# Patient Record
Sex: MALE | Race: WHITE | Employment: UNEMPLOYED | URBAN - METROPOLITAN AREA
[De-identification: names, ages, dates, MRNs, and addresses within clinical notes are randomized per-mention and may not be internally consistent; named-entity substitution may affect disease eponyms.]

---
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
## 2025-01-06 ENCOUNTER — OFFICE VISIT (OUTPATIENT)
Dept: SURGERY | Age: 20
End: 2025-01-06
Payer: OTHER GOVERNMENT

## 2025-01-06 VITALS — SYSTOLIC BLOOD PRESSURE: 114 MMHG | HEART RATE: 87 BPM | DIASTOLIC BLOOD PRESSURE: 68 MMHG

## 2025-01-06 DIAGNOSIS — L92.9 HYPERGRANULATION: ICD-10-CM

## 2025-01-06 DIAGNOSIS — T14.8XXA WOUND INFECTION: ICD-10-CM

## 2025-01-06 DIAGNOSIS — T81.30XA WOUND DEHISCENCE: ICD-10-CM

## 2025-01-06 DIAGNOSIS — L08.89 PILONIDAL DISEASE OF NATAL CLEFT: Primary | ICD-10-CM

## 2025-01-06 DIAGNOSIS — L08.9 WOUND INFECTION: ICD-10-CM

## 2025-01-06 PROCEDURE — 99999 PR OFFICE/OUTPT VISIT,PROCEDURE ONLY: CPT | Performed by: SURGERY

## 2025-01-06 PROCEDURE — 11042 DBRDMT SUBQ TIS 1ST 20SQCM/<: CPT | Performed by: SURGERY

## 2025-01-06 NOTE — PROGRESS NOTES
Patient underwent pilonidal excision 9/17.  Has a wound dehiscence of a portion of the wound, and follow up for ongoing wound management.     Started on silver collagen 11/26     He is not having pain.  No new symptoms or concerns.        /68 (Site: Right Upper Arm, Position: Sitting)   Pulse 87          Length 1.6 (1.8)  Depth 0.6 (0.5)    Procedure note:  Kamaljit Yee wound(s) debrided.  Devitalized, necrotic, fibrinous material and biofilm was removed.  Hemostasis by direct pressure as needed.  Instruments used:   Curette: Yes   Forceps and scissors:  No   Scalpel: No   Tissue nippers or rongeur: No  Additional injected local anesthetic: No  Tissue obtained for culture: No  Additional hemostatic agents used:   Silver Nitrate: No   Electrocautery: No   Sutures: No   Topical agents (gel foam, thrombin, Surgicel): No  Depth of Debridement - subcutaneous  Debridement Size:  Roughly 1 cm2    IMP/PLAN  1) Continues to have slow improvement.  Using silver collagen, which he changes after showering daily.  - Would like to see more improvement visit to visit  - As along as he continues to improve will continue topical wound care with periodic debridements.  - Recheck in 2 weeks.

## 2025-01-20 ENCOUNTER — OFFICE VISIT (OUTPATIENT)
Dept: SURGERY | Age: 20
End: 2025-01-20
Payer: OTHER GOVERNMENT

## 2025-01-20 VITALS
BODY MASS INDEX: 27.46 KG/M2 | WEIGHT: 165 LBS | SYSTOLIC BLOOD PRESSURE: 117 MMHG | HEART RATE: 87 BPM | DIASTOLIC BLOOD PRESSURE: 70 MMHG

## 2025-01-20 DIAGNOSIS — L92.9 HYPERGRANULATION: ICD-10-CM

## 2025-01-20 DIAGNOSIS — L08.89 PILONIDAL DISEASE OF NATAL CLEFT: ICD-10-CM

## 2025-01-20 DIAGNOSIS — T81.30XA WOUND DEHISCENCE: Primary | ICD-10-CM

## 2025-01-20 DIAGNOSIS — L08.9 WOUND INFECTION: ICD-10-CM

## 2025-01-20 DIAGNOSIS — T14.8XXA WOUND INFECTION: ICD-10-CM

## 2025-01-20 PROCEDURE — 99024 POSTOP FOLLOW-UP VISIT: CPT | Performed by: SURGERY

## 2025-01-20 PROCEDURE — 17250 CHEM CAUT OF GRANLTJ TISSUE: CPT | Performed by: SURGERY

## 2025-01-20 NOTE — PROGRESS NOTES
Patient underwent pilonidal excision 9/17.  Has a wound dehiscence of a portion of the wound, and follow up for ongoing wound management.     Started on silver collagen 11/26     He is not having pain.  No new symptoms or concerns.    /70 (Site: Right Upper Arm, Position: Sitting, Cuff Size: Medium Adult)   Pulse 87   Wt 74.8 kg (165 lb)   BMI 27.46 kg/m²     The wounds shows some hypergranulation.  I decided to use silver nitrate today rather than the curette.      Silver nitrated was applied after topical anesthesia.    Measurements.    Length - 1.3 (1.6)  Depth - 0.4 (0.6)    IMP/PLAN  1) Continues to make small improvements.  2) Continue silver collagen  3) Recheck in 2 weeks  4) Consider excision if the healing stalls.

## 2025-02-03 ENCOUNTER — OFFICE VISIT (OUTPATIENT)
Dept: SURGERY | Age: 20
End: 2025-02-03
Payer: OTHER GOVERNMENT

## 2025-02-03 ENCOUNTER — ANESTHESIA EVENT (OUTPATIENT)
Dept: OPERATING ROOM | Age: 20
End: 2025-02-03

## 2025-02-03 VITALS
WEIGHT: 163 LBS | BODY MASS INDEX: 27.12 KG/M2 | SYSTOLIC BLOOD PRESSURE: 110 MMHG | HEART RATE: 86 BPM | DIASTOLIC BLOOD PRESSURE: 74 MMHG

## 2025-02-03 DIAGNOSIS — L08.9 WOUND INFECTION: ICD-10-CM

## 2025-02-03 DIAGNOSIS — L08.89 PILONIDAL DISEASE OF NATAL CLEFT: Primary | ICD-10-CM

## 2025-02-03 DIAGNOSIS — L08.89 PILONIDAL DISEASE OF NATAL CLEFT: ICD-10-CM

## 2025-02-03 DIAGNOSIS — T14.8XXA WOUND INFECTION: ICD-10-CM

## 2025-02-03 DIAGNOSIS — L92.9 HYPERGRANULATION: ICD-10-CM

## 2025-02-03 DIAGNOSIS — Z87.2 HISTORY OF PILONIDAL CYST: ICD-10-CM

## 2025-02-03 DIAGNOSIS — T81.30XA WOUND DEHISCENCE: Primary | ICD-10-CM

## 2025-02-03 PROBLEM — T81.89XD NONHEALING SURGICAL WOUND, SUBSEQUENT ENCOUNTER: Status: ACTIVE | Noted: 2025-02-03

## 2025-02-03 PROCEDURE — 11042 DBRDMT SUBQ TIS 1ST 20SQCM/<: CPT | Performed by: SURGERY

## 2025-02-03 NOTE — PATIENT INSTRUCTIONS
SURVEY:    You may be receiving a survey from [facility] regarding your visit today.    You may get this in the mail, through your MyChart, or in your email.     Please complete the survey to enable us to provide the highest quality of care to you and your family.    If you cannot score us a very good (5 Stars) on any question, please call the office to discuss how we could of made your experience exceptional.    Thank you!    General Surgery    MD Dr. Loren Amin, DO Dr. Marco Morel, HUMZA Boo-CNP    Pain Mgmt.  Dr. Randell Felton, GABRIELLA Navarrete LPN Brenda Boehler, LPN Jena Adams, MA Emily Akers, [place]    Phone: [phone]  Fax: [phone]    Office Hours:   M-TH 8-5, F: 8-12

## 2025-02-03 NOTE — PROGRESS NOTES
Patient underwent pilonidal excision 9/17.  Has a wound dehiscence of a portion of the wound, and follow up for ongoing wound management.     Started on silver collagen 11/26     He is not having pain.  No new symptoms or concerns.    No past medical history on file.  Past Surgical History:   Procedure Laterality Date    PILONIDAL CYST EXCISION N/A 9/17/2024    PILONIDAL CYSTECTOMY performed by Mitchell Vigil MD at [facility] OR     Current Outpatient Medications   Medication Sig Dispense Refill    ibuprofen (ADVIL;MOTRIN) 200 MG tablet Take 1 tablet by mouth every 6 hours as needed for Pain (Patient not taking: Reported on 1/6/2025)      naproxen (NAPROSYN) 375 MG tablet Take 1 pill 2 times a day for 5 days, then 2 times a day as needed for pain.  Take with food about 12 hours apart. (Patient not taking: Reported on 1/6/2025) 40 tablet 0     No current facility-administered medications for this visit.     No Known Allergies  Social History     Tobacco Use    Smoking status: Never    Smokeless tobacco: Never   Vaping Use    Vaping status: Never Used   Substance Use Topics    Alcohol use: No    Drug use: No     Family History   Problem Relation Age of Onset    Diabetes Mother     Depression Sister     Diabetes Maternal Grandfather       /74 (Site: Right Upper Arm, Position: Sitting)   Pulse 86   Wt 73.9 kg (163 lb)   BMI 27.12 kg/m²     Physical Exam  Constitutional:       General: He is not in acute distress.     Appearance: He is normal weight.   Cardiovascular:      Rate and Rhythm: Normal rate and regular rhythm.   Pulmonary:      Effort: Pulmonary effort is normal. No respiratory distress.      Breath sounds: Normal breath sounds.   Abdominal:      General: Abdomen is flat. Bowel sounds are normal.      Palpations: Abdomen is soft.   Skin:     General: Skin is warm and dry.      Comments: See wound documentation below   Neurological:      Mental Status: He is alert.        Wound  Measurements  Length 1.6

## 2025-02-04 ENCOUNTER — ANESTHESIA (OUTPATIENT)
Dept: OPERATING ROOM | Age: 20
End: 2025-02-04

## 2025-02-04 PROBLEM — Z87.2 HISTORY OF PILONIDAL CYST: Status: ACTIVE | Noted: 2025-02-03

## 2025-02-10 ENCOUNTER — ANESTHESIA EVENT (OUTPATIENT)
Dept: OPERATING ROOM | Age: 20
End: 2025-02-10
Payer: OTHER GOVERNMENT

## 2025-02-11 ENCOUNTER — HOSPITAL ENCOUNTER (OUTPATIENT)
Age: 20
Setting detail: OUTPATIENT SURGERY
Discharge: HOME | End: 2025-02-11
Attending: SURGERY | Admitting: SURGERY
Payer: OTHER GOVERNMENT

## 2025-02-11 ENCOUNTER — ANESTHESIA (OUTPATIENT)
Dept: OPERATING ROOM | Age: 20
End: 2025-02-11
Payer: OTHER GOVERNMENT

## 2025-02-11 VITALS
HEART RATE: 79 BPM | OXYGEN SATURATION: 99 % | RESPIRATION RATE: 14 BRPM | BODY MASS INDEX: 27.16 KG/M2 | DIASTOLIC BLOOD PRESSURE: 66 MMHG | WEIGHT: 163 LBS | TEMPERATURE: 97 F | HEIGHT: 65 IN | SYSTOLIC BLOOD PRESSURE: 126 MMHG

## 2025-02-11 DIAGNOSIS — G89.18 POST-OP PAIN: Primary | ICD-10-CM

## 2025-02-11 DIAGNOSIS — L08.89 PILONIDAL DISEASE OF NATAL CLEFT: ICD-10-CM

## 2025-02-11 DIAGNOSIS — T81.89XD NONHEALING SURGICAL WOUND, SUBSEQUENT ENCOUNTER: ICD-10-CM

## 2025-02-11 DIAGNOSIS — Z87.2 HISTORY OF PILONIDAL CYST: ICD-10-CM

## 2025-02-11 PROCEDURE — 2500000003 HC RX 250 WO HCPCS: Performed by: SURGERY

## 2025-02-11 PROCEDURE — 7100000011 HC PHASE II RECOVERY - ADDTL 15 MIN: Performed by: SURGERY

## 2025-02-11 PROCEDURE — 6370000000 HC RX 637 (ALT 250 FOR IP)

## 2025-02-11 PROCEDURE — 3600000012 HC SURGERY LEVEL 2 ADDTL 15MIN: Performed by: SURGERY

## 2025-02-11 PROCEDURE — 3700000000 HC ANESTHESIA ATTENDED CARE: Performed by: SURGERY

## 2025-02-11 PROCEDURE — 2580000003 HC RX 258

## 2025-02-11 PROCEDURE — 3600000002 HC SURGERY LEVEL 2 BASE: Performed by: SURGERY

## 2025-02-11 PROCEDURE — 6360000002 HC RX W HCPCS: Performed by: SURGERY

## 2025-02-11 PROCEDURE — 3700000001 HC ADD 15 MINUTES (ANESTHESIA): Performed by: SURGERY

## 2025-02-11 PROCEDURE — 2500000003 HC RX 250 WO HCPCS: Performed by: NURSE ANESTHETIST, CERTIFIED REGISTERED

## 2025-02-11 PROCEDURE — 14000 TIS TRNFR TRUNK 10 SQ CM/<: CPT | Performed by: SURGERY

## 2025-02-11 PROCEDURE — 88304 TISSUE EXAM BY PATHOLOGIST: CPT

## 2025-02-11 PROCEDURE — 2709999900 HC NON-CHARGEABLE SUPPLY: Performed by: SURGERY

## 2025-02-11 PROCEDURE — 7100000010 HC PHASE II RECOVERY - FIRST 15 MIN: Performed by: SURGERY

## 2025-02-11 PROCEDURE — 11042 DBRDMT SUBQ TIS 1ST 20SQCM/<: CPT | Performed by: SURGERY

## 2025-02-11 PROCEDURE — 6360000002 HC RX W HCPCS: Performed by: NURSE ANESTHETIST, CERTIFIED REGISTERED

## 2025-02-11 RX ORDER — GLYCOPYRROLATE 1 MG/5 ML
SYRINGE (ML) INTRAVENOUS
Status: DISCONTINUED | OUTPATIENT
Start: 2025-02-11 | End: 2025-02-11 | Stop reason: SDUPTHER

## 2025-02-11 RX ORDER — IBUPROFEN 200 MG
TABLET ORAL
Qty: 60 TABLET | Refills: 0 | Status: SHIPPED | OUTPATIENT
Start: 2025-02-11

## 2025-02-11 RX ORDER — METOCLOPRAMIDE HYDROCHLORIDE 5 MG/ML
10 INJECTION INTRAMUSCULAR; INTRAVENOUS
Status: DISCONTINUED | OUTPATIENT
Start: 2025-02-11 | End: 2025-02-11 | Stop reason: HOSPADM

## 2025-02-11 RX ORDER — DIMENHYDRINATE 50 MG
50 TABLET ORAL ONCE
Status: COMPLETED | OUTPATIENT
Start: 2025-02-11 | End: 2025-02-11

## 2025-02-11 RX ORDER — LIDOCAINE HYDROCHLORIDE 20 MG/ML
INJECTION, SOLUTION EPIDURAL; INFILTRATION; INTRACAUDAL; PERINEURAL
Status: DISCONTINUED | OUTPATIENT
Start: 2025-02-11 | End: 2025-02-11 | Stop reason: SDUPTHER

## 2025-02-11 RX ORDER — NEOSTIGMINE METHYLSULFATE 1 MG/ML
INJECTION INTRAVENOUS
Status: DISCONTINUED | OUTPATIENT
Start: 2025-02-11 | End: 2025-02-11 | Stop reason: SDUPTHER

## 2025-02-11 RX ORDER — SODIUM CHLORIDE 0.9 % (FLUSH) 0.9 %
5-40 SYRINGE (ML) INJECTION EVERY 12 HOURS SCHEDULED
Status: DISCONTINUED | OUTPATIENT
Start: 2025-02-11 | End: 2025-02-11 | Stop reason: HOSPADM

## 2025-02-11 RX ORDER — ONDANSETRON 2 MG/ML
INJECTION INTRAMUSCULAR; INTRAVENOUS
Status: DISCONTINUED | OUTPATIENT
Start: 2025-02-11 | End: 2025-02-11 | Stop reason: SDUPTHER

## 2025-02-11 RX ORDER — HYDROCODONE BITARTRATE AND ACETAMINOPHEN 5; 325 MG/1; MG/1
1 TABLET ORAL EVERY 6 HOURS PRN
Qty: 20 TABLET | Refills: 0 | Status: SHIPPED | OUTPATIENT
Start: 2025-02-11 | End: 2025-02-18

## 2025-02-11 RX ORDER — SODIUM CHLORIDE 0.9 % (FLUSH) 0.9 %
5-40 SYRINGE (ML) INJECTION PRN
Status: DISCONTINUED | OUTPATIENT
Start: 2025-02-11 | End: 2025-02-11 | Stop reason: HOSPADM

## 2025-02-11 RX ORDER — ACETAMINOPHEN 325 MG/1
650 TABLET ORAL ONCE
Status: COMPLETED | OUTPATIENT
Start: 2025-02-11 | End: 2025-02-11

## 2025-02-11 RX ORDER — FENTANYL CITRATE 50 UG/ML
50 INJECTION, SOLUTION INTRAMUSCULAR; INTRAVENOUS EVERY 5 MIN PRN
Status: DISCONTINUED | OUTPATIENT
Start: 2025-02-11 | End: 2025-02-11 | Stop reason: HOSPADM

## 2025-02-11 RX ORDER — PROPOFOL 10 MG/ML
INJECTION, EMULSION INTRAVENOUS
Status: DISCONTINUED | OUTPATIENT
Start: 2025-02-11 | End: 2025-02-11 | Stop reason: SDUPTHER

## 2025-02-11 RX ORDER — DEXAMETHASONE SODIUM PHOSPHATE 4 MG/ML
INJECTION, SOLUTION INTRA-ARTICULAR; INTRALESIONAL; INTRAMUSCULAR; INTRAVENOUS; SOFT TISSUE
Status: DISCONTINUED | OUTPATIENT
Start: 2025-02-11 | End: 2025-02-11 | Stop reason: SDUPTHER

## 2025-02-11 RX ORDER — FENTANYL CITRATE 50 UG/ML
INJECTION, SOLUTION INTRAMUSCULAR; INTRAVENOUS
Status: DISCONTINUED | OUTPATIENT
Start: 2025-02-11 | End: 2025-02-11 | Stop reason: SDUPTHER

## 2025-02-11 RX ORDER — KETOROLAC TROMETHAMINE 30 MG/ML
INJECTION, SOLUTION INTRAMUSCULAR; INTRAVENOUS
Status: DISCONTINUED | OUTPATIENT
Start: 2025-02-11 | End: 2025-02-11 | Stop reason: SDUPTHER

## 2025-02-11 RX ORDER — SODIUM CHLORIDE 9 MG/ML
INJECTION, SOLUTION INTRAVENOUS CONTINUOUS
Status: DISCONTINUED | OUTPATIENT
Start: 2025-02-11 | End: 2025-02-11 | Stop reason: HOSPADM

## 2025-02-11 RX ORDER — HYDROCODONE BITARTRATE AND ACETAMINOPHEN 5; 325 MG/1; MG/1
1 TABLET ORAL EVERY 6 HOURS PRN
Status: DISCONTINUED | OUTPATIENT
Start: 2025-02-11 | End: 2025-02-11 | Stop reason: HOSPADM

## 2025-02-11 RX ORDER — ROCURONIUM BROMIDE 10 MG/ML
INJECTION, SOLUTION INTRAVENOUS
Status: DISCONTINUED | OUTPATIENT
Start: 2025-02-11 | End: 2025-02-11 | Stop reason: SDUPTHER

## 2025-02-11 RX ORDER — SODIUM CHLORIDE 9 MG/ML
INJECTION, SOLUTION INTRAVENOUS PRN
Status: DISCONTINUED | OUTPATIENT
Start: 2025-02-11 | End: 2025-02-11 | Stop reason: HOSPADM

## 2025-02-11 RX ADMIN — ACETAMINOPHEN 650 MG: 325 TABLET ORAL at 09:56

## 2025-02-11 RX ADMIN — SODIUM CHLORIDE: 9 INJECTION, SOLUTION INTRAVENOUS at 09:58

## 2025-02-11 RX ADMIN — FENTANYL CITRATE 50 MCG: 50 INJECTION INTRAMUSCULAR; INTRAVENOUS at 11:20

## 2025-02-11 RX ADMIN — FENTANYL CITRATE 50 MCG: 50 INJECTION INTRAMUSCULAR; INTRAVENOUS at 11:06

## 2025-02-11 RX ADMIN — NEOSTIGMINE METHYLSULFATE 3 MG: 1 INJECTION, SOLUTION INTRAVENOUS at 11:52

## 2025-02-11 RX ADMIN — DEXAMETHASONE SODIUM PHOSPHATE 4 MG: 4 INJECTION INTRA-ARTICULAR; INTRALESIONAL; INTRAMUSCULAR; INTRAVENOUS; SOFT TISSUE at 11:25

## 2025-02-11 RX ADMIN — ONDANSETRON 4 MG: 2 INJECTION, SOLUTION INTRAMUSCULAR; INTRAVENOUS at 11:25

## 2025-02-11 RX ADMIN — KETOROLAC TROMETHAMINE 30 MG: 30 INJECTION, SOLUTION INTRAMUSCULAR at 11:54

## 2025-02-11 RX ADMIN — PROPOFOL 200 MG: 10 INJECTION, EMULSION INTRAVENOUS at 11:06

## 2025-02-11 RX ADMIN — DIMENHYDRINATE 50 MG: 50 TABLET ORAL at 09:56

## 2025-02-11 RX ADMIN — LIDOCAINE HYDROCHLORIDE 100 MG: 20 INJECTION, SOLUTION EPIDURAL; INFILTRATION; INTRACAUDAL; PERINEURAL at 11:06

## 2025-02-11 RX ADMIN — Medication 0.4 MG: at 11:52

## 2025-02-11 RX ADMIN — ROCURONIUM BROMIDE 30 MG: 10 INJECTION, SOLUTION INTRAVENOUS at 11:06

## 2025-02-11 RX ADMIN — CEFOXITIN SODIUM 2000 MG: 2 POWDER, FOR SOLUTION INTRAVENOUS at 11:19

## 2025-02-11 ASSESSMENT — PAIN - FUNCTIONAL ASSESSMENT: PAIN_FUNCTIONAL_ASSESSMENT: 0-10

## 2025-02-11 ASSESSMENT — PAIN SCALES - GENERAL: PAINLEVEL_OUTOF10: 0

## 2025-02-11 NOTE — DISCHARGE INSTRUCTIONS
SAME DAY SURGERY DISCHARGE INSTRUCTIONS    1.  Do not drive or operate hazardous machinery for 24 hours.    2.  Do not make important personal or business decisions for 24 hours.    3.  Do not drink alcoholic beverages for 24 hours.    4.  Do not smoke tobacco products for 24 hours.    5.  Eat light foods and drink plenty of fluids up to 8 glasses per day, as you can tolerate.    6.  If your bandages become soaked with bright red blood, place another dressing pad over your bandages.  (DO NOT remove original bandage.)  Call your surgeon for further instructions.  A small amount of bright red blood is to be expected.    7.  Limit your activities for 24 hours.  Do not engage in heavy work until your surgeon gives you permission.      8.  Patient should not be left alone for 12-24 hours following surgical procedure.    9.  Report the following signs or any questions regarding your physical condition to your surgeon immediately:    Excessive swelling of, or around the wound area.    Redness.    Temperature of 100 degrees (F) or above.    Excessive pain.    10.  Call your surgeon for any questions regarding your surgery.    11.  Wash hands before and after incision care.  It is important to practice good personal hygiene during the post op period.      Additional Instructions   1) Avoid sitting or other activities that put pressure on or stretch you wound.  2) Keep the wound clean. Shower or sitz bath after a bowel movement.   3) I have prescribed you 2 different pain medications  - Ibuprofen (it is available over the counter) is an NSAID and very good pain medication, but works better if you use it regularly. Use it regularly 400 mg 4  times a day for the first 5 days, then you can reduce it to using it as needed if you would like.  - I have also prescribed a narcotic pain medication, hydrocodone/acetaminophen, which you may use additionally as needed for pain.  As you pain improves you may substitute plain

## 2025-02-11 NOTE — PROGRESS NOTES
Patient refused offers of fluids or snacks at this time. Mom at bedside and aware to let me know if he changes his mind. Patient pretty drowsy still. Denies complaints of pain.   Eye Protection Verbiage: Before proceeding with the stage, a plastic scleral shield was inserted. The globe was anesthetized with a few drops of tetracaine hydrochloride. Then, an appropriate sized scleral shield was chosen and coated with lacrilube ointment. The shield was gently inserted and left in place for the duration of each stage. After the stage was completed, the shield was gently removed.

## 2025-02-11 NOTE — ANESTHESIA POSTPROCEDURE EVALUATION
Department of Anesthesiology  Postprocedure Note    Patient: Kamaljit Yee  MRN: [id number]  YOB: 2005  Date of evaluation: 2/11/2025    Procedure Summary       Date: 02/11/25 Room / Location: [address]    Anesthesia Start: 1104 Anesthesia Stop: 1202    Procedure: PILONIDAL CYSTECTOMY - Wound exploration and/or revision (Coccyx) Diagnosis:       History of pilonidal cyst      (History of pilonidal cyst [Z87.2])    Surgeons: Mitchell Vigil MD Responsible Provider: Jacques Dobson APRN - CRNA    Anesthesia Type: general ASA Status: 1            Anesthesia Type: No value filed.    Kaiden Phase I: Kaiden Score: 10    Kaiden Phase II: Kaiden Score: 10    Anesthesia Post Evaluation    Patient location during evaluation: bedside  Patient participation: complete - patient participated  Level of consciousness: awake and alert  Airway patency: patent  Nausea & Vomiting: no nausea and no vomiting  Cardiovascular status: hemodynamically stable  Respiratory status: acceptable  Hydration status: stable  Multimodal analgesia pain management approach  Pain management: adequate    No notable events documented.

## 2025-02-11 NOTE — H&P
Patient underwent pilonidal excision 9/17.  Has a wound dehiscence of a portion of the wound, and follow up for ongoing wound management.     Started on silver collagen 11/26     He is not having pain.  No new symptoms or concerns.    History reviewed. No pertinent past medical history.  Past Surgical History:   Procedure Laterality Date    PILONIDAL CYST EXCISION N/A 9/17/2024    PILONIDAL CYSTECTOMY performed by Mitchell Vigil MD at [facility] OR     Current Facility-Administered Medications   Medication Dose Route Frequency Provider Last Rate Last Admin    0.9 % sodium chloride infusion   IntraVENous Continuous Rupinder Bello APRN - CRNA 100 mL/hr at 02/11/25 0958 New Bag at 02/11/25 0958     No Known Allergies  Social History     Tobacco Use    Smoking status: Never    Smokeless tobacco: Never   Vaping Use    Vaping status: Never Used   Substance Use Topics    Alcohol use: No    Drug use: No     Family History   Problem Relation Age of Onset    Diabetes Mother     Depression Sister     Diabetes Maternal Grandfather       /68   Pulse 82   Temp 97.9 °F (36.6 °C) (Temporal)   Resp 17   Ht 1.651 m (5' 5\")   Wt 73.9 kg (163 lb)   SpO2 98%   BMI 27.12 kg/m²     Physical Exam  Constitutional:       General: He is not in acute distress.     Appearance: He is normal weight.   Cardiovascular:      Rate and Rhythm: Normal rate and regular rhythm.   Pulmonary:      Effort: Pulmonary effort is normal. No respiratory distress.      Breath sounds: Normal breath sounds.   Abdominal:      General: Abdomen is flat. Bowel sounds are normal.      Palpations: Abdomen is soft.   Skin:     General: Skin is warm and dry.      Comments: See wound documentation below   Neurological:      Mental Status: He is alert.        Wound  Measurements  Length 1.6 (1.3)  Depth 0.5 (0.4)    The wound has unhealthy granulation, that is very soft an friable.        Wound was treated with silver nitrate and curettement today, to  remove/debride the unhealthy tissue. The area debrided was subcutaneous tissue roughly 1.6 cm2    IMP/PLAN  1) Pilonidal Wound - Dehiscense  - Wound has stalled. Not much improvement in the past 4 weeks, and measure a little large today.  - I recommend we excise the non-healing area and close it.  I will try to move the incision farther off the midline too.  This may not work and he could continue to have wound healing problems , But the wound has not healed in nearly 5 months.  It very slowly improve, until it have finally stalled.  - He is agreeable to proceeding.

## 2025-02-11 NOTE — OP NOTE
Patient: Kamaljit Yee  YOB: 2005  MRN: [id number]    Date of Procedure: 2025    Pre-operative Diagnosis: Pilonidal Disease Brandy Cleft with non-healing post surgical wound     Post-operative Diagnosis:  Same    Procedure: Excision of wound (skin and subcutaneous 4.0 x 1.5 cm) with local advancement flap closure (Flap is about 1.5 x 4.0 cm or about 6 cm2)    Surgeon(s):  Mitchell Vigil MD    Assistant:   * No surgical staff found *    Anesthesia: Monitor Anesthesia Care    Estimated Blood Loss (mL): Minimal    Complications: None    Specimens:   ID Type Source Tests Collected by Time Destination   A : excision of pilonidal wound Tissue Coccyx SURGICAL PATHOLOGY Mitchell Vigil MD 2025 1140        Implants:  * No implants in log *      Drains: * No LDAs found *    Patient was brought to the operating room and general anesthesia was induced.  He was placed in prone jackknife position.  The skin of his buttocks and ARCELIA wound area was carefully prepped and draped.  He has a wound roughly 1 and half centimeters long maybe a little bit smaller than the nonhealing at the inferior end of a old pilonidal cystectomy incision.        It tunnels slightly superiorly.  I marked out an incision such that the suture line would be moved off the midline when we closed it.  This is a crescent shaped incision the intent of bringing the skin from his left buttock across to the right buttock.  Then using sharp dissection electrocautery the wound was excised back to normal skin and subcutaneous tissue.  I spent quite a bit of time making sure we had good hemostasis of the wound.  Wound was also irrigated with Irrisept and let it well and the wound for over a minute.  Went back and looked at her hemostasis again make sure everything was nice and dry there was any significant bleeding.  Next I proceeded with a layered closure.  Seven 2-0 Vicryl was used in the subcutaneous tissue to bring the skin together.  I

## 2025-02-13 LAB — SURGICAL PATHOLOGY REPORT: NORMAL

## 2025-02-19 ENCOUNTER — OFFICE VISIT (OUTPATIENT)
Dept: SURGERY | Age: 20
End: 2025-02-19

## 2025-02-19 VITALS
WEIGHT: 166 LBS | DIASTOLIC BLOOD PRESSURE: 83 MMHG | HEART RATE: 69 BPM | RESPIRATION RATE: 18 BRPM | SYSTOLIC BLOOD PRESSURE: 122 MMHG | OXYGEN SATURATION: 98 % | BODY MASS INDEX: 27.62 KG/M2

## 2025-02-19 DIAGNOSIS — T81.30XA WOUND DEHISCENCE: Primary | ICD-10-CM

## 2025-02-19 DIAGNOSIS — L08.89 PILONIDAL DISEASE OF NATAL CLEFT: ICD-10-CM

## 2025-02-19 PROCEDURE — 99024 POSTOP FOLLOW-UP VISIT: CPT | Performed by: SURGERY

## 2025-02-19 RX ORDER — HYDROCODONE BITARTRATE AND ACETAMINOPHEN 5; 325 MG/1; MG/1
1 TABLET ORAL EVERY 6 HOURS PRN
COMMUNITY

## 2025-02-19 NOTE — PROGRESS NOTES
8 days post re-excision pilonidal wound.    The wound closed nicely with minimal tension.        Unfortunately today it appears most of the sutures have pulled through. And it did not heal at all.          Measure about     Length 2.4  Width 0.4  Depth 1.3    Additionally there was a seroma which drained after removing the sutures.    It does not look infected.    IMP/PLAN  1) Not sure why he is not healing better.  I suspect he is not staying off of it, that is sitting quite a bit.   2) Will have him keep it clean and covered. Recheck in a week.

## 2025-02-19 NOTE — PATIENT INSTRUCTIONS
SURVEY:    You may be receiving a survey from [facility] regarding your visit today.    You may get this in the mail, through your MyChart, or in your email.     Please complete the survey to enable us to provide the highest quality of care to you and your family.    If you cannot score us a very good (5 Stars) on any question, please call the office to discuss how we could of made your experience exceptional.    Thank you!    General Surgery    MD Dr. Loren Amin, DO Dr. Marco Morel, HUMZA Boo-CNP    Pain Mgmt.  Dr. Randell Felton, GABRIELLA Navarrete LPN Brenda Boehler, LPN Jena Adams, MA Emily Akers, [place]    Phone: [phone]  Fax: [phone]    Office Hours:   M-TH 8-5, F: 8-12

## 2025-03-03 ENCOUNTER — OFFICE VISIT (OUTPATIENT)
Dept: SURGERY | Age: 20
End: 2025-03-03

## 2025-03-03 VITALS
SYSTOLIC BLOOD PRESSURE: 105 MMHG | BODY MASS INDEX: 27.62 KG/M2 | WEIGHT: 166 LBS | DIASTOLIC BLOOD PRESSURE: 67 MMHG | HEART RATE: 76 BPM

## 2025-03-03 DIAGNOSIS — T81.30XA WOUND DEHISCENCE: Primary | ICD-10-CM

## 2025-03-03 DIAGNOSIS — L08.89 PILONIDAL DISEASE OF NATAL CLEFT: ICD-10-CM

## 2025-03-03 PROCEDURE — 99024 POSTOP FOLLOW-UP VISIT: CPT | Performed by: SURGERY

## 2025-03-03 NOTE — PROGRESS NOTES
Ongoing management of dehiscence of his pilonidal wound.    Currently treatment - keep it clean and covered.  No special dressing.    Pain has resolved and he is only having a small amount of drainage.    /67 (Site: Left Upper Arm, Position: Sitting)   Pulse 76   Wt 75.3 kg (166 lb)   BMI 27.62 kg/m²         The wound is fairly clean.  No exudate or hypergranulation.    Length 2.7 (2.4)  Width 0.7 (0.4)  Depth 1.0 (1.3)    IMP/PLAN   1) The wound looks pretty clean. All of the measurements are a little large except depth. Suspect some retraction of the wound.  2) Continue to keep it clean and covered.  Recheck in 3 weeks (I am not here in 2 weeks).

## 2025-03-24 ENCOUNTER — OFFICE VISIT (OUTPATIENT)
Dept: SURGERY | Age: 20
End: 2025-03-24
Payer: OTHER GOVERNMENT

## 2025-03-24 VITALS — SYSTOLIC BLOOD PRESSURE: 121 MMHG | DIASTOLIC BLOOD PRESSURE: 71 MMHG | HEART RATE: 87 BPM

## 2025-03-24 DIAGNOSIS — L08.89 PILONIDAL DISEASE OF NATAL CLEFT: ICD-10-CM

## 2025-03-24 DIAGNOSIS — T81.30XA WOUND DEHISCENCE: Primary | ICD-10-CM

## 2025-03-24 DIAGNOSIS — L92.9 HYPERGRANULATION: ICD-10-CM

## 2025-03-24 PROCEDURE — 99024 POSTOP FOLLOW-UP VISIT: CPT | Performed by: SURGERY

## 2025-03-24 PROCEDURE — 17250 CHEM CAUT OF GRANLTJ TISSUE: CPT | Performed by: SURGERY

## 2025-03-24 NOTE — PROGRESS NOTES
Ongoing management of dehiscence of his pilonidal wound.    Original excision 9/17/24, re-excision 2/11/25     Currently treatment - keep it clean and covered.  No special dressing.     /71 (BP Site: Right Upper Arm, Patient Position: Sitting)   Pulse 87       Length 2.0 (2.7)  Width - Depends on how much traction placed on the wound  Depth 0.8 (1.0)    It had heavy hypergranulation. So it was treated with silver nitrate.    IMP/PLAN  1) Continue current treatment.  2) Recheck in 3 weeks.

## 2025-04-14 ENCOUNTER — OFFICE VISIT (OUTPATIENT)
Dept: SURGERY | Age: 20
End: 2025-04-14
Payer: OTHER GOVERNMENT

## 2025-04-14 VITALS — DIASTOLIC BLOOD PRESSURE: 74 MMHG | HEART RATE: 63 BPM | SYSTOLIC BLOOD PRESSURE: 110 MMHG

## 2025-04-14 DIAGNOSIS — T81.30XA WOUND DEHISCENCE: Primary | ICD-10-CM

## 2025-04-14 DIAGNOSIS — L08.89 PILONIDAL DISEASE OF NATAL CLEFT: ICD-10-CM

## 2025-04-14 DIAGNOSIS — L92.9 HYPERGRANULATION: ICD-10-CM

## 2025-04-14 PROCEDURE — 17250 CHEM CAUT OF GRANLTJ TISSUE: CPT | Performed by: SURGERY

## 2025-04-14 PROCEDURE — 11042 DBRDMT SUBQ TIS 1ST 20SQCM/<: CPT | Performed by: SURGERY

## 2025-04-14 NOTE — PROGRESS NOTES
Ongoing management of dehiscence of his pilonidal wound.     Original excision 9/17/24, re-excision 2/11/25     Currently treatment - keep it clean and covered.  No special dressing.    He and his mother believe it is getting better.    /74 (BP Site: Right Upper Arm, Patient Position: Sitting)   Pulse 63 .        The wound has filled in in the depth circumferentially.  It is a little hard to see in the photo. There was a large amount of hypergranulation, hair and other debris within the wound.     Topical anesthetic was applied.  Silver nitrate was used on the hypergranulation, and then it was debrided with a #4 curette. Tolerated well    Measurements.    Depth 1.1 (0.9)  Length 1.9 (2.0)    IMP/PLAN  1) Measurements don't  how it has filled in form the sides.  2) Slowly closing  3) Recheck in 2 - 3 weeks.    
No

## 2025-05-05 ENCOUNTER — OFFICE VISIT (OUTPATIENT)
Dept: SURGERY | Age: 20
End: 2025-05-05
Payer: OTHER GOVERNMENT

## 2025-05-05 VITALS — SYSTOLIC BLOOD PRESSURE: 118 MMHG | DIASTOLIC BLOOD PRESSURE: 78 MMHG | HEART RATE: 84 BPM

## 2025-05-05 DIAGNOSIS — T81.30XA WOUND DEHISCENCE: Primary | ICD-10-CM

## 2025-05-05 DIAGNOSIS — L08.89 PILONIDAL DISEASE OF NATAL CLEFT: ICD-10-CM

## 2025-05-05 DIAGNOSIS — L92.9 HYPERGRANULATION: ICD-10-CM

## 2025-05-05 PROCEDURE — 17250 CHEM CAUT OF GRANLTJ TISSUE: CPT | Performed by: SURGERY

## 2025-05-05 PROCEDURE — 99999 PR OFFICE/OUTPT VISIT,PROCEDURE ONLY: CPT | Performed by: SURGERY

## 2025-05-05 NOTE — PROGRESS NOTES
Ongoing management of dehiscence of his pilonidal wound.     Original excision 9/17/24, re-excision 2/11/25     Currently treatment - keep it clean and covered.  No special dressing.     He and his mother believe it is getting better again today.    /78 (BP Site: Right Upper Arm, Patient Position: Sitting, BP Cuff Size: Medium Adult)   Pulse 84         Significantly small    Length 0.8 (1.9)  Depth 0.3 (1.1)    It was hypergranulated, so I did treat it with silver nitrate.    IMP/PLAN  1) Marked improvement  2) Recheck in about 4 weeks

## 2025-06-09 ENCOUNTER — OFFICE VISIT (OUTPATIENT)
Dept: SURGERY | Age: 20
End: 2025-06-09
Payer: OTHER GOVERNMENT

## 2025-06-09 VITALS
HEIGHT: 65 IN | WEIGHT: 161.1 LBS | HEART RATE: 72 BPM | BODY MASS INDEX: 26.84 KG/M2 | OXYGEN SATURATION: 96 % | SYSTOLIC BLOOD PRESSURE: 126 MMHG | TEMPERATURE: 96.8 F | DIASTOLIC BLOOD PRESSURE: 74 MMHG

## 2025-06-09 DIAGNOSIS — L08.89 PILONIDAL DISEASE OF NATAL CLEFT: Primary | ICD-10-CM

## 2025-06-09 DIAGNOSIS — T81.30XA WOUND DEHISCENCE: ICD-10-CM

## 2025-06-09 DIAGNOSIS — L92.9 HYPERGRANULATION: ICD-10-CM

## 2025-06-09 PROCEDURE — 17250 CHEM CAUT OF GRANLTJ TISSUE: CPT | Performed by: SURGERY

## 2025-06-09 PROCEDURE — 99999 PR OFFICE/OUTPT VISIT,PROCEDURE ONLY: CPT | Performed by: SURGERY

## 2025-06-09 NOTE — PATIENT INSTRUCTIONS
SURVEY:    You may be receiving a survey from [facility] regarding your visit today.    You may get this in the mail, through your MyChart, or in your email.     Please complete the survey to enable us to provide the highest quality of care to you and your family.    If you cannot score us a very good (5 Stars) on any question, please call the office to discuss how we could of made your experience exceptional.    Thank you!    General Surgery    MD Dr. Loren Amin, DO  Dr. Marco Morel, DO  Gina Osei, HUMZA-CNP    Pain Mgmt.  Dr. Randell Felton, DO  JOSETTE Teague, JEFFRY Ash LPN Jena Adams, MA Emily Akers, MA    Phone: [phone]  Fax: [phone]    Office Hours:   M-TH 8-5, F: 8-12

## 2025-06-09 NOTE — PROGRESS NOTES
Ongoing management of dehiscence of his pilonidal wound.     Original excision 9/17/24, re-excision 2/11/25     Currently treatment - keep it clean and covered.  No special dressing.    No complaints or concerns.    /74   Pulse 72   Temp 96.8 °F (36 °C) (Tympanic)   Ht 1.651 m (5' 5\")   Wt 73.1 kg (161 lb 1.6 oz)   SpO2 96%   BMI 26.81 kg/m²         It measure similar to his last visit.  There was quite a bit of hair and lint in the wound, and significant hypergranulation.    After application of topical anesthetic, silver nitrate was applied.    IMP/PLAN  1) Wound may have stalled  - The was a lot of foreign body in the wound today, so he needs to be more diligent keeping it clean.  - There is not much left to the wound, but it is very slow to heal.  - Recheck in 2 - 4 weeks.

## 2025-06-30 ENCOUNTER — OFFICE VISIT (OUTPATIENT)
Dept: SURGERY | Age: 20
End: 2025-06-30
Payer: OTHER GOVERNMENT

## 2025-06-30 VITALS — SYSTOLIC BLOOD PRESSURE: 116 MMHG | DIASTOLIC BLOOD PRESSURE: 77 MMHG | HEART RATE: 75 BPM

## 2025-06-30 DIAGNOSIS — L08.89 PILONIDAL DISEASE OF NATAL CLEFT: Primary | ICD-10-CM

## 2025-06-30 PROCEDURE — 99212 OFFICE O/P EST SF 10 MIN: CPT | Performed by: SURGERY

## 2025-06-30 NOTE — PROGRESS NOTES
Patient and his mother believe the wound is healed.    /77 (BP Site: Right Upper Arm, Patient Position: Sitting, BP Cuff Size: Medium Adult)   Pulse 75         He has some dimpling of the skin, which may increase the risk of recurrence, but the wound is completely healed.    IMP/PLAN  1) Still should keep the area clean and consider regular hair removal to reduce the risk of relapse.  His mother brought laser hair removal.  That is any option.  While not permanent, it does give long lasting removal. It is medially reasonable to do.  If he wants to pursue laser hair removal, I believe there as adequate medical reason to it so his insurance may cover it. I can provide a letter if needed and desired.  2) Follow up PRN

## 2025-06-30 NOTE — PATIENT INSTRUCTIONS
SURVEY:    You may be receiving a survey [facility] regarding your [facility] today.    You may get this in the mail, through your MyChart, or in your email.     Please complete the survey to enable us to provide the highest quality of care to you and your family.    If you cannot score us a very good (5 Stars) on any question, please call the office to discuss how we could of made your experience exceptional.    Thank you!    General Surgery    MD Dr. Loren Amin, DO  Dr. Marco Morel, DO  Gina Osei, HUMZA-CNP    Pain Mgmt.  Dr. Randell Felton, DO  JOSETTE Teague, JEFFRY Ash LPN Jena Adams, MA Emily Akers, MA    Phone: [phone]  Fax: [phone]    Office Hours:   M-TH 8-5, F: 8-12

## (undated) DEVICE — SOLUTION IRRIG 1000ML 0.9% SOD CHL USP POUR PLAS BTL

## (undated) DEVICE — 450 ML BOTTLE OF 0.05% CHLORHEXIDINE GLUCONATE IN 99.95% STERILE WATER FOR IRRIGATION, USP AND APPLICATOR.: Brand: IRRISEPT ANTIMICROBIAL WOUND LAVAGE

## (undated) DEVICE — GAUZE,SPONGE,FLUFF,6"X6.75",STRL,5/TRAY: Brand: MEDLINE

## (undated) DEVICE — SUTURE VICRYL + SZ 3-0 L27IN ABSRB UD L26MM SH 1/2 CIR VCP416H

## (undated) DEVICE — GOWN,AURORA,NON-REINFORCED,2XL: Brand: MEDLINE

## (undated) DEVICE — SPONGE LAP W18XL18IN WHT COT 4 PLY FLD STRUNG RADPQ DISP ST 2 PER PACK

## (undated) DEVICE — PREVENA INCISION MANAGEMENT SYSTEM- PEEL & PLACE DRESSING: Brand: PREVENA™ PEEL & PLACE™

## (undated) DEVICE — TOWEL,OR,DSP,ST,NATURAL,DLX,4/PK,20PK/CS: Brand: MEDLINE

## (undated) DEVICE — TOWEL,OR,DSP,ST,BLUE,STD,4/PK,20PK/CS: Brand: MEDLINE

## (undated) DEVICE — TUBING, SUCTION, 3/16" X 20', STRAIGHT: Brand: MEDLINE

## (undated) DEVICE — PREMIUM DRY TRAY LF: Brand: MEDLINE INDUSTRIES, INC.

## (undated) DEVICE — LABEL PRNT MARKER W/ PRE PRNT CUST

## (undated) DEVICE — SUTURE NONABSORBABLE MONOFILAMENT 2-0 FS 18 IN ETHILON 664H

## (undated) DEVICE — GLOVE SURG SZ 8 L12IN FNGR THK87MIL WHT LTX FREE

## (undated) DEVICE — SUTURE VICRYL SZ 2-0 L27IN ABSRB UD L26MM SH 1/2 CIR J417H

## (undated) DEVICE — GAUZE,SPONGE,4"X4",12PLY,STERILE,LF,2'S: Brand: MEDLINE

## (undated) DEVICE — MERCY TIFFIN BASIC-LF: Brand: MEDLINE INDUSTRIES, INC.

## (undated) DEVICE — INTENDED FOR TISSUE SEPARATION, AND OTHER PROCEDURES THAT REQUIRE A SHARP SURGICAL BLADE TO PUNCTURE OR CUT.: Brand: BARD-PARKER ® CARBON RIB-BACK BLADES

## (undated) DEVICE — SPONGE GZ W4XL4IN COT 12 PLY TYP VII WVN C FLD DSGN STERILE